# Patient Record
Sex: MALE | Race: WHITE | Employment: STUDENT | URBAN - NONMETROPOLITAN AREA
[De-identification: names, ages, dates, MRNs, and addresses within clinical notes are randomized per-mention and may not be internally consistent; named-entity substitution may affect disease eponyms.]

---

## 2020-11-07 ENCOUNTER — HOSPITAL ENCOUNTER (EMERGENCY)
Age: 20
Discharge: HOME | End: 2020-11-07
Payer: MEDICAID

## 2020-11-07 VITALS
BODY MASS INDEX: 18.65 KG/M2 | HEIGHT: 75 IN | HEART RATE: 67 BPM | SYSTOLIC BLOOD PRESSURE: 135 MMHG | DIASTOLIC BLOOD PRESSURE: 75 MMHG | OXYGEN SATURATION: 100 % | RESPIRATION RATE: 18 BRPM | WEIGHT: 150 LBS

## 2020-11-07 PROCEDURE — 99283 EMERGENCY DEPT VISIT LOW MDM: CPT

## 2020-11-07 PROCEDURE — 6370000000 HC RX 637 (ALT 250 FOR IP)

## 2020-11-07 RX ORDER — ONDANSETRON 4 MG/1
TABLET, ORALLY DISINTEGRATING ORAL
Status: COMPLETED
Start: 2020-11-07 | End: 2020-11-07

## 2020-11-07 RX ORDER — ONDANSETRON 4 MG/1
4 TABLET, ORALLY DISINTEGRATING ORAL ONCE
Status: COMPLETED | OUTPATIENT
Start: 2020-11-07 | End: 2020-11-07

## 2020-11-07 RX ADMIN — ONDANSETRON 4 MG: 4 TABLET, ORALLY DISINTEGRATING ORAL at 22:27

## 2020-11-08 ASSESSMENT — ENCOUNTER SYMPTOMS
RHINORRHEA: 0
COLOR CHANGE: 0
CHEST TIGHTNESS: 0
BACK PAIN: 0
ABDOMINAL DISTENTION: 0
VOMITING: 1
NAUSEA: 1
SHORTNESS OF BREATH: 0
COUGH: 0
ABDOMINAL PAIN: 0

## 2020-11-08 NOTE — ED NOTES
Pt to ED via triage for c/o n/v after snorting possible Fentanyl for the first time tonight. Pt states ingesting approx 30-60 mins prior to arrival. Pt shows no s/s of OD at this time. Pt is conscious, A&Ox4, resps easy and unlabored. Pt actively dry heaving and vomiting during triage. Pt states no other concerns at this time. Will monitor.      Greg Juarez RN  11/07/20 9475

## 2020-11-08 NOTE — ED PROVIDER NOTES
[facility] [address] [place] [address]       Chief Complaint   Patient presents with    Nausea    Emesis     After snorting Fentanyl       Nurses Notes reviewed and I agree except as noted in the HPI. HISTORY OF PRESENT ILLNESS    Saúl Lehman flakito 20 y.o. male who presents to the ED for evaluation of nausea and vomiting and anxiety. Patient states after work today he was hanging with some of his friends, he drank some alcohol, he smokes marijuana, a friend had a pill that he gave him, he snorted it and he has been having nausea and dry heaving ever since. He notes this occurred about 1-1/2 hours ago. He is beginning to feel better now. He denies history of use of opiates before in the past.  He denies any significant medical history. He denies any diarrhea, denies chest pain shortness of breath. He does feel like he is having a panic attack. HPI was provided by the patient. REVIEW OF SYSTEMS     Review of Systems   Constitutional: Negative for chills, fatigue and fever. HENT: Negative for congestion and rhinorrhea. Respiratory: Negative for cough, chest tightness and shortness of breath. Cardiovascular: Negative for chest pain. Gastrointestinal: Positive for nausea and vomiting. Negative for abdominal distention and abdominal pain. Genitourinary: Negative for difficulty urinating, dysuria and flank pain. Musculoskeletal: Negative for arthralgias and back pain. Skin: Negative for color change and rash. Allergic/Immunologic: Negative for immunocompromised state. Neurological: Negative for dizziness, weakness, numbness and headaches. Hematological: Does not bruise/bleed easily. Psychiatric/Behavioral: Negative for agitation, behavioral problems and confusion. PAST MEDICAL HISTORY   No past medical history on file. SURGICALHISTORY      has no past surgical history on file.     CURRENT MEDICATIONS     There are no discharge medications for this patient. ALLERGIES     has No Known Allergies. FAMILY HISTORY     has no family status information on file. family history is not on file. SOCIAL HISTORY       Social History     Socioeconomic History    Marital status: Single     Spouse name: Not on file    Number of children: Not on file    Years of education: Not on file    Highest education level: Not on file   Occupational History    Not on file   Social Needs    Financial resource strain: Not on file    Food insecurity     Worry: Not on file     Inability: Not on file    Transportation needs     Medical: Not on file     Non-medical: Not on file   Tobacco Use    Smoking status: Not on file   Substance and Sexual Activity    Alcohol use: Not on file    Drug use: Not on file    Sexual activity: Not on file   Lifestyle    Physical activity     Days per week: Not on file     Minutes per session: Not on file    Stress: Not on file   Relationships    Social connections     Talks on phone: Not on file     Gets together: Not on file     Attends Taoist service: Not on file     Active member of club or organization: Not on file     Attends meetings of clubs or organizations: Not on file     Relationship status: Not on file    Intimate partner violence     Fear of current or ex partner: Not on file     Emotionally abused: Not on file     Physically abused: Not on file     Forced sexual activity: Not on file   Other Topics Concern    Not on file   Social History Narrative    Not on file       PHYSICAL EXAM     INITIAL VITALS:  height is 6' 3\" (1.905 m) and weight is 150 lb (68 kg). His blood pressure is 135/75 and his pulse is 67. His respiration is 18 and oxygen saturation is 100%. Physical Exam  Vitals signs and nursing note reviewed. Constitutional:       Appearance: Normal appearance. He is well-developed. HENT:      Head: Normocephalic. Nose: Nose normal.      Mouth/Throat:      Pharynx: Uvula midline.    Eyes: Conjunctiva/sclera: Conjunctivae normal.   Neck:      Musculoskeletal: Normal range of motion and neck supple. Cardiovascular:      Rate and Rhythm: Normal rate and regular rhythm. Heart sounds: Normal heart sounds, S1 normal and S2 normal.   Pulmonary:      Effort: Pulmonary effort is normal. No respiratory distress. Breath sounds: Normal breath sounds. Chest:      Chest wall: No tenderness. Abdominal:      General: Bowel sounds are normal. There is no distension. Palpations: Abdomen is soft. Tenderness: There is no abdominal tenderness. Musculoskeletal: Normal range of motion. Skin:     General: Skin is warm and dry. Coloration: Skin is not pale. Findings: No erythema or rash. Neurological:      Mental Status: He is alert and oriented to person, place, and time. Psychiatric:         Behavior: Behavior normal.         Thought Content: Thought content normal.         Judgment: Judgment normal.         DIFFERENTIAL DIAGNOSIS:   Opiate overdose, opiate side effect, anxiety attack    DIAGNOSTIC RESULTS         RADIOLOGY: non-plainfilm images(s) such as CT, Ultrasound and MRI are read by the radiologist.  Plain radiographic images are visualized and preliminarily interpreted by the emergency physician unless otherwise stated below. No orders to display         LABS:   Labs Reviewed - No data to display    EMERGENCY DEPARTMENT COURSE:   Vitals:    Vitals:    11/07/20 2040   BP: 135/75   Pulse: 67   Resp: 18   TempSrc: Oral   SpO2: 100%   Weight: 150 lb (68 kg)   Height: 6' 3\" (1.905 m)       MDM    Patient was seen and evaluated in the emergency department, patient appeared to be in no acute distress, vital signs reviewed, no significant findings noted. Physical exam was completed, no significant findings noted. I did not feel labs or imaging were necessary at this time. He was given a Zofran ODT, he is able to tolerate oral intake after this.   Discussed my findings and plan of care the patient is amenable discharge. He is advised to return to the emergency department there worsening signs or symptoms. Stop using illicit substances. He verbalized understanding plan of care. Medications   ondansetron (ZOFRAN-ODT) disintegrating tablet 4 mg (4 mg Oral Given 11/7/20 4207)       Patient was seenindependently by myself. The patient's final impression and disposition and plan was determined by myself. CRITICAL CARE:   None    CONSULTS:  None    PROCEDURES:  None    FINAL IMPRESSION     1. Fentanyl poisoning, accidental or unintentional, initial encounter          DISPOSITION/PLAN   Patient discharged in stable condition  PATIENT REFERREDTO:  [facility] [address] [address] EMERGENCY DEPT  [address]  Go to   If symptoms worsen      DISCHARGE MEDICATIONS:  There are no discharge medications for this patient. (Please note that portions of this note were completed with a voice recognition program.  Efforts were made to edit the dictations but occasionally words are mis-transcribed.)    Provider:  I personally performed the services described in the documentation,reviewed and edited the documentation which was dictated to the scribe in my presence, and it accurately records my words and actions.     Stuart Reed CNP 11/08/20 1:42 AM    Trevor Reed, APRN - CNP        [employer], APRLAURA - CNP  11/08/20 0148